# Patient Record
Sex: MALE | URBAN - METROPOLITAN AREA
[De-identification: names, ages, dates, MRNs, and addresses within clinical notes are randomized per-mention and may not be internally consistent; named-entity substitution may affect disease eponyms.]

---

## 2024-01-25 ENCOUNTER — LAB (OUTPATIENT)
Dept: LAB | Facility: LAB | Age: 24
End: 2024-01-25

## 2024-01-25 DIAGNOSIS — Z11.1 ENCOUNTER FOR SCREENING FOR RESPIRATORY TUBERCULOSIS: Primary | ICD-10-CM

## 2024-01-25 DIAGNOSIS — Z01.84 ENCOUNTER FOR ANTIBODY RESPONSE EXAMINATION: ICD-10-CM

## 2024-01-25 LAB — HBV SURFACE AB SER-ACNC: 4.7 MIU/ML

## 2024-01-25 PROCEDURE — 86481 TB AG RESPONSE T-CELL SUSP: CPT

## 2024-01-25 PROCEDURE — 36415 COLL VENOUS BLD VENIPUNCTURE: CPT

## 2024-01-25 PROCEDURE — 86706 HEP B SURFACE ANTIBODY: CPT

## 2024-01-27 LAB
NIL(NEG) CONTROL SPOT COUNT: NORMAL
PANEL A SPOT COUNT: 0
PANEL B SPOT COUNT: 0
POS CONTROL SPOT COUNT: NORMAL
T-SPOT. TB INTERPRETATION: NEGATIVE

## 2024-03-08 ENCOUNTER — LAB (OUTPATIENT)
Dept: LAB | Facility: LAB | Age: 24
End: 2024-03-08

## 2024-03-08 DIAGNOSIS — Z01.84 ENCOUNTER FOR ANTIBODY RESPONSE EXAMINATION: Primary | ICD-10-CM

## 2024-03-08 LAB — HBV SURFACE AG SERPL QL IA: NONREACTIVE

## 2024-03-08 PROCEDURE — 87340 HEPATITIS B SURFACE AG IA: CPT

## 2024-07-29 LAB — HBV SURFACE AG SERPL QL IA: NONREACTIVE

## 2025-04-24 ENCOUNTER — CLINICAL SUPPORT (OUTPATIENT)
Dept: EMERGENCY MEDICINE | Facility: HOSPITAL | Age: 25
End: 2025-04-24
Payer: MEDICAID

## 2025-04-24 ENCOUNTER — HOSPITAL ENCOUNTER (EMERGENCY)
Facility: HOSPITAL | Age: 25
Discharge: HOME | End: 2025-04-24
Attending: EMERGENCY MEDICINE
Payer: MEDICAID

## 2025-04-24 ENCOUNTER — APPOINTMENT (OUTPATIENT)
Dept: RADIOLOGY | Facility: HOSPITAL | Age: 25
End: 2025-04-24
Payer: MEDICAID

## 2025-04-24 VITALS
HEART RATE: 97 BPM | HEIGHT: 66 IN | BODY MASS INDEX: 42.27 KG/M2 | SYSTOLIC BLOOD PRESSURE: 147 MMHG | OXYGEN SATURATION: 99 % | WEIGHT: 263 LBS | TEMPERATURE: 98.1 F | DIASTOLIC BLOOD PRESSURE: 97 MMHG | RESPIRATION RATE: 16 BRPM

## 2025-04-24 DIAGNOSIS — M79.661 RIGHT CALF PAIN: Primary | ICD-10-CM

## 2025-04-24 LAB
ALBUMIN SERPL BCP-MCNC: 5 G/DL (ref 3.4–5)
ALP SERPL-CCNC: 75 U/L (ref 33–120)
ALT SERPL W P-5'-P-CCNC: 30 U/L (ref 10–52)
ANION GAP SERPL CALC-SCNC: 15 MMOL/L (ref 10–20)
AST SERPL W P-5'-P-CCNC: 16 U/L (ref 9–39)
ATRIAL RATE: 93 BPM
BASOPHILS # BLD AUTO: 0.08 X10*3/UL (ref 0–0.1)
BASOPHILS NFR BLD AUTO: 0.8 %
BILIRUB SERPL-MCNC: 0.6 MG/DL (ref 0–1.2)
BNP SERPL-MCNC: 15 PG/ML (ref 0–99)
BUN SERPL-MCNC: 10 MG/DL (ref 6–23)
CALCIUM SERPL-MCNC: 9.7 MG/DL (ref 8.6–10.6)
CARDIAC TROPONIN I PNL SERPL HS: 10 NG/L (ref 0–53)
CHLORIDE SERPL-SCNC: 103 MMOL/L (ref 98–107)
CO2 SERPL-SCNC: 24 MMOL/L (ref 21–32)
CREAT SERPL-MCNC: 0.66 MG/DL (ref 0.5–1.3)
D DIMER PPP FEU-MCNC: 356 NG/ML FEU
EGFRCR SERPLBLD CKD-EPI 2021: >90 ML/MIN/1.73M*2
EOSINOPHIL # BLD AUTO: 0.23 X10*3/UL (ref 0–0.7)
EOSINOPHIL NFR BLD AUTO: 2.2 %
ERYTHROCYTE [DISTWIDTH] IN BLOOD BY AUTOMATED COUNT: 13.6 % (ref 11.5–14.5)
GLUCOSE SERPL-MCNC: 86 MG/DL (ref 74–99)
HCT VFR BLD AUTO: 51.1 % (ref 41–52)
HGB BLD-MCNC: 17.7 G/DL (ref 13.5–17.5)
IMM GRANULOCYTES # BLD AUTO: 0.04 X10*3/UL (ref 0–0.7)
IMM GRANULOCYTES NFR BLD AUTO: 0.4 % (ref 0–0.9)
LYMPHOCYTES # BLD AUTO: 1.75 X10*3/UL (ref 1.2–4.8)
LYMPHOCYTES NFR BLD AUTO: 17 %
MCH RBC QN AUTO: 27.2 PG (ref 26–34)
MCHC RBC AUTO-ENTMCNC: 34.6 G/DL (ref 32–36)
MCV RBC AUTO: 79 FL (ref 80–100)
MONOCYTES # BLD AUTO: 0.73 X10*3/UL (ref 0.1–1)
MONOCYTES NFR BLD AUTO: 7.1 %
NEUTROPHILS # BLD AUTO: 7.44 X10*3/UL (ref 1.2–7.7)
NEUTROPHILS NFR BLD AUTO: 72.5 %
NRBC BLD-RTO: 0 /100 WBCS (ref 0–0)
P AXIS: 56 DEGREES
P OFFSET: 187 MS
P ONSET: 143 MS
PLATELET # BLD AUTO: 382 X10*3/UL (ref 150–450)
POTASSIUM SERPL-SCNC: 3.5 MMOL/L (ref 3.5–5.3)
PR INTERVAL: 142 MS
PROT SERPL-MCNC: 8.2 G/DL (ref 6.4–8.2)
Q ONSET: 214 MS
QRS COUNT: 15 BEATS
QRS DURATION: 90 MS
QT INTERVAL: 344 MS
QTC CALCULATION(BAZETT): 427 MS
QTC FREDERICIA: 398 MS
R AXIS: 43 DEGREES
RBC # BLD AUTO: 6.5 X10*6/UL (ref 4.5–5.9)
SODIUM SERPL-SCNC: 138 MMOL/L (ref 136–145)
T AXIS: 45 DEGREES
T OFFSET: 386 MS
VENTRICULAR RATE: 93 BPM
WBC # BLD AUTO: 10.3 X10*3/UL (ref 4.4–11.3)

## 2025-04-24 PROCEDURE — 83880 ASSAY OF NATRIURETIC PEPTIDE: CPT | Performed by: EMERGENCY MEDICINE

## 2025-04-24 PROCEDURE — 84484 ASSAY OF TROPONIN QUANT: CPT | Performed by: EMERGENCY MEDICINE

## 2025-04-24 PROCEDURE — 93971 EXTREMITY STUDY: CPT | Performed by: RADIOLOGY

## 2025-04-24 PROCEDURE — 36415 COLL VENOUS BLD VENIPUNCTURE: CPT | Performed by: EMERGENCY MEDICINE

## 2025-04-24 PROCEDURE — 85379 FIBRIN DEGRADATION QUANT: CPT | Performed by: EMERGENCY MEDICINE

## 2025-04-24 PROCEDURE — 99285 EMERGENCY DEPT VISIT HI MDM: CPT | Mod: 25 | Performed by: EMERGENCY MEDICINE

## 2025-04-24 PROCEDURE — 80053 COMPREHEN METABOLIC PANEL: CPT | Performed by: EMERGENCY MEDICINE

## 2025-04-24 PROCEDURE — 93010 ELECTROCARDIOGRAM REPORT: CPT

## 2025-04-24 PROCEDURE — 99284 EMERGENCY DEPT VISIT MOD MDM: CPT

## 2025-04-24 PROCEDURE — 93971 EXTREMITY STUDY: CPT

## 2025-04-24 PROCEDURE — 93005 ELECTROCARDIOGRAM TRACING: CPT

## 2025-04-24 PROCEDURE — 85025 COMPLETE CBC W/AUTO DIFF WBC: CPT | Performed by: EMERGENCY MEDICINE

## 2025-04-24 ASSESSMENT — COLUMBIA-SUICIDE SEVERITY RATING SCALE - C-SSRS
6. HAVE YOU EVER DONE ANYTHING, STARTED TO DO ANYTHING, OR PREPARED TO DO ANYTHING TO END YOUR LIFE?: NO
2. HAVE YOU ACTUALLY HAD ANY THOUGHTS OF KILLING YOURSELF?: NO
1. IN THE PAST MONTH, HAVE YOU WISHED YOU WERE DEAD OR WISHED YOU COULD GO TO SLEEP AND NOT WAKE UP?: NO

## 2025-04-24 ASSESSMENT — PAIN - FUNCTIONAL ASSESSMENT: PAIN_FUNCTIONAL_ASSESSMENT: 0-10

## 2025-04-24 ASSESSMENT — PAIN SCALES - GENERAL: PAINLEVEL_OUTOF10: 6

## 2025-04-24 NOTE — ED PROVIDER NOTES
HPI   Chief Complaint   Patient presents with    Leg Pain    Shortness of Breath   This is a 24-year-old male with past medical history significant for hypertension and hyperlipidemia who presents to the ED with right leg pain.  Patient states that for the past 4 days he has been having pain in the back of his right calf, rated 6/10, worse with ambulation.  Denies any falls or traumas or injuries. He reports that he has been on two, 5-hour flights within the last week. He returned to [place] this past Monday.  He is a student at the [email or web address] and was concerned he may have a blood clot in his leg. He reports that his father had a DVT/PE, but he denies any personal history of blood clots or bleeding disorders.     Denies fevers, chills, headache, dizziness, cough, chest pain, SOB, ABD pain, N/V/D, numbness, or tingling.    Limitations to history: None  Independent Historians: Patient  External Records Reviewed: None    Patient History   Medical History[1]  Surgical History[2]  Family History[3]  Social History[4]    Physical Exam   ED Triage Vitals [04/24/25 1133]   Temperature Heart Rate Respirations BP   36.7 °C (98.1 °F) 97 16 (!) 147/97      Pulse Ox Temp Source Heart Rate Source Patient Position   99 % Oral Monitor Lying      BP Location FiO2 (%)     Left arm --       Physical Exam  Constitutional:       General: He is not in acute distress.     Appearance: He is not toxic-appearing or diaphoretic.   HENT:      Head: Normocephalic and atraumatic.   Cardiovascular:      Rate and Rhythm: Normal rate and regular rhythm.      Pulses:           Dorsalis pedis pulses are 2+ on the right side and 2+ on the left side.      Heart sounds: Normal heart sounds. No murmur heard.     No friction rub. No gallop.   Pulmonary:      Effort: Pulmonary effort is normal. No respiratory distress.      Breath sounds: Normal breath sounds. No wheezing, rhonchi or rales.   Chest:      Chest wall: No tenderness.   Abdominal:       Palpations: Abdomen is soft.      Tenderness: There is no abdominal tenderness. There is no guarding or rebound.   Musculoskeletal:         General: No deformity or signs of injury.      Right lower leg: No edema.      Left lower leg: No edema.      Comments: Bilateral lower extremities are atraumatic in appearance. No gross deformities or obvious signs of injury. Muscle bulk is equal bilaterally. Muscle strength is 5/5 bilaterally.  Patient does endorse mild right sided calf tenderness.  Full, non tender, ROM of both hips, knees and ankles. Overlying skin of the lower extremities appears well perfused with temperature equally warm to the touch throughout. No areas of ecchymosis, erythema, induration, fluctuance or increased warmth. Equal limb circumference with no pitting edema, ulcers or varicosity. Patient was observed to ambulate, unassisted, with a stable gait.            Skin:     General: Skin is warm and dry.      Coloration: Skin is not pale.   Neurological:      General: No focal deficit present.      Mental Status: He is alert and oriented to person, place, and time.      Motor: No weakness.      Gait: Gait normal.      Comments: Strength 5/5 in all extremities.    Psychiatric:         Mood and Affect: Mood normal.           ED Course & MDM   ED Course as of 04/24/25 1706   Thu Apr 24, 2025   1430 ECG 12 lead  This EKG was independently reviewed by me at 14:30 on 4/24/2025  Indication: Leg pain  Rate: 93  Rhythm: Normal sinus rhythm  Axis: Normal  ST Segment: Normal ST and T wave pattern with no evidence of STEMI or acute ischemia  Comparison: No old EKGs available for comparison [LH]   1452 Patient declined any medication for pain [LH]      ED Course User Index  [LH] Natividad Thomson PA-C         Diagnoses as of 04/24/25 1706   Right calf pain         Medical Decision Making  This is a 24-year-old male with past medical history significant for hypertension and hyperlipidemia who presents to the ED  with right leg pain.  Patient states that for the past 4 days he has been having pain in the back of his right calf, rated 6/10, worse with ambulation.  Denies any falls or traumas or injuries. He reports that he has been on two, 5-hour flights within the last week, he returned to [place] this past Monday.  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    On physical exam, the patient is not ill-appearing, nondiaphoretic and in no acute distress. The patient is afebrile with stable vital signs. Bilateral lower extremities are atraumatic in appearance. No gross deformities or obvious signs of injury. No bony tenderness. Muscle bulk is equal bilaterally. Muscle strength is 5/5 bilaterally.  Patient does endorse mild right sided calf tenderness. He has full ROM intact of both hips, knees and ankles. Overlying skin of the lower extremities appears well perfused with temperature equally warm to the touch throughout. No areas of ecchymosis, erythema, induration, fluctuance or increased warmth. Equal limb circumference with no pitting edema, ulcers or varicosity. Patient was observed to ambulate, unassisted, with a stable gait.  Pedal pulses are strong bilaterally    CBC was unremarkable, no anemia or leukocytosis.  CMP shows normal renal and hepatic function without any electrolyte disturbances.  Troponin is nonelevated, EKG shows no evidence of STEMI or acute ischemia, low suspicion for ACS.  BNP is normal, with low suspicion for CHF.  VTE exclusive D-dimer is not elevated. The patient denies any coughing, chest pain or SOB, vitals are stable with no hypoxia or tachycardia, reassuring for no PE.  Venous duplex of the RLE was obtained and demonstrates no evidence of acute DVT.    Discussed results of ED findings and counseled patient to follow-up with a PCP, provided the contact information for the [facility].  Advised him to take Tylenol or Motrin for pain, as  needed. Discussed ED return precautions.  Patient verbalized understanding was agreeable to plan of care.  He was discharged stable condition. They should feel free to return to the Emergency Department at any time should their condition worsen or should they have any questions or concerns.       Labs Reviewed   CBC WITH AUTO DIFFERENTIAL - Abnormal       Result Value    WBC 10.3      nRBC 0.0      RBC 6.50 (*)     Hemoglobin 17.7 (*)     Hematocrit 51.1      MCV 79 (*)     MCH 27.2      MCHC 34.6      RDW 13.6      Platelets 382      Neutrophils % 72.5      Immature Granulocytes %, Automated 0.4      Lymphocytes % 17.0      Monocytes % 7.1      Eosinophils % 2.2      Basophils % 0.8      Neutrophils Absolute 7.44      Immature Granulocytes Absolute, Automated 0.04      Lymphocytes Absolute 1.75      Monocytes Absolute 0.73      Eosinophils Absolute 0.23      Basophils Absolute 0.08     COMPREHENSIVE METABOLIC PANEL - Normal    Glucose 86      Sodium 138      Potassium 3.5      Chloride 103      Bicarbonate 24      Anion Gap 15      Urea Nitrogen 10      Creatinine 0.66      eGFR >90      Calcium 9.7      Albumin 5.0      Alkaline Phosphatase 75      Total Protein 8.2      AST 16      Bilirubin, Total 0.6      ALT 30     D-DIMER, VTE EXCLUSION - Normal    D-Dimer, Quantitative VTE Exclusion 356      Narrative:     The VTE Exclusion D-Dimer assay is reported in ng/mL Fibrinogen Equivalent Units (FEU).    Per 's instructions for use, a value of less than 500 ng/mL (FEU) may help to exclude DVT or PE in outpatients when the assay is used with a clinical pretest probability assessment.(AE must utilize and document eCalc 'Wells Score Deep Vein Thrombosis Risk' for DVT exclusion only. Emergency Department should utilize  Guidelines for Emergency Department Use of the VTE Exclusion D-Dimer and Clinical Pretest probability assessment model for DVT or PE exclusion.)   B-TYPE NATRIURETIC PEPTIDE - Normal     BNP 15      Narrative:        <100 pg/mL - Heart failure unlikely  100-299 pg/mL - Intermediate probability of acute heart                  failure exacerbation. Correlate with clinical                  context and patient history.    >=300 pg/mL - Heart Failure likely. Correlate with clinical                  context and patient history.     Biotin interference may cause falsely decreased results. Patients taking a Biotin dose of up to 5 mg/day should refrain from taking Biotin for 24 hours before sample  collection. Providers may contact their local laboratory for further information.   TROPONIN I, HIGH SENSITIVITY - Normal    Troponin I, High Sensitivity (CMC) 10      Narrative:     Less than 99th percentile of normal range cutoff-  Female and children under 18 years old <35 ng/L; Male <54 ng/L: Negative  Repeat testing should be performed if clinically indicated.     Female and children under 18 years old  ng/L; Male  ng/L:  Consistent with possible cardiac damage and possible increased clinical   risk. Serial measurements may help to assess extent of myocardial damage.     >120 ng/L: Consistent with cardiac damage, increased clinical risk and  myocardial infarction. Serial measurements may help assess extent of   myocardial damage.      NOTE: Children less than 1 year old may have higher baseline troponin   levels and results should be interpreted in conjunction with the overall   clinical context.    NOTE: Troponin I testing is performed using a different   testing methodology at [facility] than at other   [facility] hospitals. Direct result comparisons should only   be made within the same method.             Lower extremity venous duplex right   Final Result   No sonographic evidence for deep vein thrombosis within the evaluated   veins of the right lower extremity.        I personally reviewed the images/study and I agree with the findings   as stated by resident Chuck Tovar.  This study was interpreted   at [facility], [place], [place].        MACRO:   None        Signed by: Sanchez Chapman 4/24/2025 4:41 PM   Dictation workstation:   [id number]              Natividad Thosmon PA-C  04/24/25 0228    This patient was seen by the advanced practice provider.  I have personally performed a substantive portion of the encounter.  I have seen and examined the patient; agree with the workup, evaluation, MDM, management and diagnosis.  The care plan has been discussed.      I personally saw the patient and made/approved the management plan and take responsibility for the patient management.    Akanksha Mcintyre MD          [1] No past medical history on file.  [2] No past surgical history on file.  [3] No family history on file.  [4]   Social History  Tobacco Use    Smoking status: Not on file    Smokeless tobacco: Not on file   Substance Use Topics    Alcohol use: Not on file    Drug use: Not on file        Akanksha Mcintyre MD  04/24/25 5612

## 2025-04-24 NOTE — ED TRIAGE NOTES
Pt C/O right calf pain and SOB with exertion x4 days. Pt reports recent travel with 5hr flights. PMH: HTN, HLD. VSS, Resp E&U, NAD at time of triage.

## 2025-04-24 NOTE — DISCHARGE INSTRUCTIONS
You do not have any evidence of DVT on your ultrasound.  You may take Tylenol or Motrin for your pain as needed.  Follow-up with a primary care physician as needed.  Please return to ED if you develop any new or worsening symptoms